# Patient Record
(demographics unavailable — no encounter records)

---

## 2025-04-28 NOTE — HISTORY OF PRESENT ILLNESS
[Y] : Positive pregnancy history [Currently Active] : currently active [Men] : men [Vaginal] : vaginal [Mammogramdate] : 10/23 [TextBox_19] : SHIRA with sono- Benign [BreastSonogramDate] : 10/23 [TextBox_25] : FIBROCYSTIC RT. BREAST [PapSmeardate] : 2023 [TextBox_31] : Ngative [LMPDate] : 04/11/25 [PGHxTotal] : 2 [[facility] [FreeTextEntry1] : 2 NSVDS [TextBox_36] : H/O HPV positive pap in 2018.  [TextBox_34] : H/O fibrocystic breasts

## 2025-04-28 NOTE — PLAN
[FreeTextEntry1] :  Pap smear with HPV testing drawn and sent. Prescription for mammogram and breast ultrasound given

## 2025-04-28 NOTE — PHYSICAL EXAM
[Appropriately responsive] : appropriately responsive [Alert] : alert [No Acute Distress] : no acute distress [No Lymphadenopathy] : no lymphadenopathy [Regular Rate Rhythm] : regular rate rhythm [No Murmurs] : no murmurs [Clear to Auscultation B/L] : clear to auscultation bilaterally [Soft] : soft [Non-tender] : non-tender [Non-distended] : non-distended [No HSM] : No HSM [No Lesions] : no lesions [No Mass] : no mass [Oriented x3] : oriented x3 [FreeTextEntry1] :  normal, no lesions [FreeTextEntry2] : Normal, no lesions [FreeTextEntry4] : Normal, no lesions seen or palpated.  Scanty white discharge in vault [FreeTextEntry5] :  smooth, pink, no lesions.  No cervical motion tenderness [FreeTextEntry6] :  anteverted, small, mobile, nontender.  No adnexal masses or tenderness bilaterally

## 2025-04-28 NOTE — HISTORY OF PRESENT ILLNESS
[Y] : Positive pregnancy history [Currently Active] : currently active [Men] : men [Vaginal] : vaginal [Mammogramdate] : 10/23 [TextBox_19] : SHIRA with sono- Benign [BreastSonogramDate] : 10/23 [TextBox_25] : FIBROCYSTIC RT. BREAST [PapSmeardate] : 2023 [TextBox_31] : Ngative [LMPDate] : 04/11/25 [PGHxTotal] : 2 [facility] [[facility]xLiving] : 2 [FreeTextEntry1] : 2 NSVDS [TextBox_36] : H/O HPV positive pap in 2018.  [TextBox_34] : H/O fibrocystic breasts